# Patient Record
(demographics unavailable — no encounter records)

---

## 2018-10-20 NOTE — NUR
28 yo f bib self w/ c/o bl lower quadrant pain that is sharp 9/10 radiating to 
her back x 20 minutes. pt presents to ed crying and grasping site. pt denies 
n/v/d, denies dysuria. abd soft, non-tender. bowel sounds active x 4 quadrants. 
pt does not present w/ a fever at this time. PATIENT STATES PAIN OF 9/10 AT 
THIS TIME; VSS; PATIENT POSITIONED FOR COMFORT; HOB ELEVATED; BEDRAILS UP X2; 
BED DOWN. ER MD MADE AWARE OF PT STATUS.

## 2018-10-20 NOTE — NUR
Patient discharged with v/s stable. Written and verbal after care instructions 
given and explained. 

Patient alert, oriented and verbalized understanding of instructions. 
Ambulatory with steady gait. All questions addressed prior to discharge. ID 
band removed. Patient advised to follow up with PMD. Rx of MOTRIN, TRAMADOL 
given. Patient educated on indication of medication including possible reaction 
and side effects. Opportunity to ask questions provided and answered.